# Patient Record
Sex: FEMALE | Race: WHITE | NOT HISPANIC OR LATINO | Employment: UNEMPLOYED | URBAN - METROPOLITAN AREA
[De-identification: names, ages, dates, MRNs, and addresses within clinical notes are randomized per-mention and may not be internally consistent; named-entity substitution may affect disease eponyms.]

---

## 2022-09-26 ENCOUNTER — TRANSCRIBE ORDERS (OUTPATIENT)
Dept: LAB | Facility: HOSPITAL | Age: 27
End: 2022-09-26

## 2022-09-26 ENCOUNTER — LAB (OUTPATIENT)
Dept: LAB | Facility: HOSPITAL | Age: 27
End: 2022-09-26

## 2022-09-26 DIAGNOSIS — Z11.4 SCREENING FOR HUMAN IMMUNODEFICIENCY VIRUS: ICD-10-CM

## 2022-09-26 DIAGNOSIS — Z11.3 SCREENING EXAMINATION FOR VENEREAL DISEASE: ICD-10-CM

## 2022-09-26 DIAGNOSIS — E28.2 POLYCYSTIC OVARIES: ICD-10-CM

## 2022-09-26 DIAGNOSIS — N92.1 METRORRHAGIA: ICD-10-CM

## 2022-09-26 DIAGNOSIS — Z11.3 SCREENING EXAMINATION FOR VENEREAL DISEASE: Primary | ICD-10-CM

## 2022-09-26 LAB
ABO GROUP BLD: NORMAL
BLD GP AB SCN SERPL QL: NEGATIVE
ESTRADIOL SERPL HS-MCNC: 125 PG/ML
PROLACTIN SERPL-MCNC: 14.1 NG/ML (ref 4.79–23.3)
RH BLD: POSITIVE
TSH SERPL DL<=0.05 MIU/L-ACNC: 2.34 UIU/ML (ref 0.27–4.2)

## 2022-09-26 PROCEDURE — 86644 CMV ANTIBODY: CPT

## 2022-09-26 PROCEDURE — 87340 HEPATITIS B SURFACE AG IA: CPT

## 2022-09-26 PROCEDURE — 84443 ASSAY THYROID STIM HORMONE: CPT | Performed by: SPECIALIST

## 2022-09-26 PROCEDURE — 86762 RUBELLA ANTIBODY: CPT

## 2022-09-26 PROCEDURE — 86787 VARICELLA-ZOSTER ANTIBODY: CPT

## 2022-09-26 PROCEDURE — 36415 COLL VENOUS BLD VENIPUNCTURE: CPT

## 2022-09-26 PROCEDURE — 82670 ASSAY OF TOTAL ESTRADIOL: CPT | Performed by: SPECIALIST

## 2022-09-26 PROCEDURE — 86704 HEP B CORE ANTIBODY TOTAL: CPT

## 2022-09-26 PROCEDURE — 87801 DETECT AGNT MULT DNA AMPLI: CPT

## 2022-09-26 PROCEDURE — 83498 ASY HYDROXYPROGESTERONE 17-D: CPT | Performed by: SPECIALIST

## 2022-09-26 PROCEDURE — 86703 HIV-1/HIV-2 1 RESULT ANTBDY: CPT

## 2022-09-26 PROCEDURE — 86901 BLOOD TYPING SEROLOGIC RH(D): CPT

## 2022-09-26 PROCEDURE — 84146 ASSAY OF PROLACTIN: CPT | Performed by: SPECIALIST

## 2022-09-26 PROCEDURE — 86850 RBC ANTIBODY SCREEN: CPT

## 2022-09-26 PROCEDURE — 86803 HEPATITIS C AB TEST: CPT

## 2022-09-26 PROCEDURE — 86645 CMV ANTIBODY IGM: CPT

## 2022-09-26 PROCEDURE — 82626 DEHYDROEPIANDROSTERONE: CPT | Performed by: SPECIALIST

## 2022-09-26 PROCEDURE — 86790 VIRUS ANTIBODY NOS: CPT

## 2022-09-26 PROCEDURE — 87591 N.GONORRHOEAE DNA AMP PROB: CPT

## 2022-09-26 PROCEDURE — 86780 TREPONEMA PALLIDUM: CPT

## 2022-09-26 PROCEDURE — 86900 BLOOD TYPING SEROLOGIC ABO: CPT

## 2022-09-26 PROCEDURE — 87491 CHLMYD TRACH DNA AMP PROBE: CPT

## 2022-09-27 LAB
CMV IGG SERPL IA-ACNC: >10 U/ML (ref 0–0.59)
CMV IGM SERPL IA-ACNC: 64.9 AU/ML (ref 0–29.9)
RUBV IGG SERPL IA-ACNC: 5.01 INDEX
VZV IGG SER IA-ACNC: 811 INDEX

## 2022-09-29 LAB — DHEA SERPL-MCNC: 168 NG/DL (ref 31–701)

## 2022-09-30 LAB
C TRACH RRNA SPEC DONR QL NAA+PROBE: NEGATIVE
HBV CORE AB SER DONR QL IA: NEGATIVE
HBV SURFACE AG SER DONR QL IA: NEGATIVE
HCV AB SER DONR QL IA: NEGATIVE
HIV 1+2 AB+HIV1 P24 AG SERPL QL IA: NEGATIVE
HIV1 RNA SERPL DONR QL PROBE AMP: NORMAL
HTLV I+II AB SER DONR QL: NEGATIVE
N GONORRHOEA RRNA SPEC DONR QL NAA+PROBE: NEGATIVE
T PALLIDUM IGG SER DONR QL: NON REACTIVE

## 2022-10-02 LAB — 17OHP SERPL-MCNC: 175 NG/DL

## 2022-10-27 ENCOUNTER — TRANSCRIBE ORDERS (OUTPATIENT)
Dept: ADMINISTRATIVE | Facility: HOSPITAL | Age: 27
End: 2022-10-27

## 2022-10-27 DIAGNOSIS — Z31.41 FERTILITY TESTING: Primary | ICD-10-CM

## 2022-11-03 ENCOUNTER — HOSPITAL ENCOUNTER (OUTPATIENT)
Dept: GENERAL RADIOLOGY | Facility: HOSPITAL | Age: 27
Discharge: HOME | End: 2022-11-03
Admitting: SPECIALIST

## 2022-11-03 ENCOUNTER — APPOINTMENT (OUTPATIENT)
Dept: GENERAL RADIOLOGY | Facility: HOSPITAL | Age: 27
End: 2022-11-03

## 2022-11-03 DIAGNOSIS — Z31.41 FERTILITY TESTING: ICD-10-CM

## 2022-11-03 PROCEDURE — 25010000002 IOPAMIDOL 61 % SOLUTION: Performed by: SPECIALIST

## 2022-11-03 PROCEDURE — 74740 X-RAY FEMALE GENITAL TRACT: CPT

## 2022-11-03 RX ADMIN — IOPAMIDOL 20 ML: 612 INJECTION, SOLUTION INTRAVENOUS at 08:51

## 2024-07-11 ENCOUNTER — OFFICE VISIT (OUTPATIENT)
Dept: CARDIOLOGY | Facility: CLINIC | Age: 29
End: 2024-07-11
Payer: COMMERCIAL

## 2024-07-11 VITALS
HEIGHT: 62 IN | HEART RATE: 109 BPM | BODY MASS INDEX: 39.2 KG/M2 | SYSTOLIC BLOOD PRESSURE: 106 MMHG | DIASTOLIC BLOOD PRESSURE: 66 MMHG | WEIGHT: 213 LBS

## 2024-07-11 DIAGNOSIS — R94.31 ABNORMAL EKG: Primary | ICD-10-CM

## 2024-07-11 DIAGNOSIS — R06.02 SHORTNESS OF BREATH: ICD-10-CM

## 2024-07-11 DIAGNOSIS — R00.0 TACHYCARDIA: ICD-10-CM

## 2024-07-11 DIAGNOSIS — D72.829 LEUKOCYTOSIS, UNSPECIFIED TYPE: ICD-10-CM

## 2024-07-11 DIAGNOSIS — R00.2 PALPITATIONS: ICD-10-CM

## 2024-07-11 DIAGNOSIS — E28.2 PCOS (POLYCYSTIC OVARIAN SYNDROME): ICD-10-CM

## 2024-07-11 DIAGNOSIS — E88.810 METABOLIC SYNDROME: ICD-10-CM

## 2024-07-11 RX ORDER — OMEPRAZOLE 10 MG/1
10 CAPSULE, DELAYED RELEASE ORAL DAILY
COMMUNITY

## 2024-07-11 RX ORDER — SEMAGLUTIDE 2.4 MG/.75ML
INJECTION, SOLUTION SUBCUTANEOUS
COMMUNITY

## 2024-07-11 RX ORDER — PROPRANOLOL HYDROCHLORIDE 160 MG/1
160 CAPSULE, EXTENDED RELEASE ORAL
COMMUNITY

## 2024-07-11 RX ORDER — CHLORAL HYDRATE 500 MG
1000 CAPSULE ORAL
COMMUNITY

## 2024-07-11 RX ORDER — LAMOTRIGINE 50 MG/1
50 TABLET, EXTENDED RELEASE ORAL DAILY
COMMUNITY

## 2024-07-11 RX ORDER — BUDESONIDE, GLYCOPYRROLATE, AND FORMOTEROL FUMARATE 160; 9; 4.8 UG/1; UG/1; UG/1
2 AEROSOL, METERED RESPIRATORY (INHALATION) 2 TIMES DAILY
COMMUNITY

## 2024-07-11 RX ORDER — HYDROXYZINE HYDROCHLORIDE 25 MG/1
25 TABLET, FILM COATED ORAL EVERY 8 HOURS PRN
COMMUNITY

## 2024-07-11 RX ORDER — BUSPIRONE HYDROCHLORIDE 30 MG/1
30 TABLET ORAL 2 TIMES DAILY
COMMUNITY

## 2024-07-11 RX ORDER — SERTRALINE HYDROCHLORIDE 150 MG/1
1 CAPSULE ORAL DAILY
COMMUNITY

## 2024-07-11 NOTE — LETTER
"July 11, 2024       No Recipients    Patient: Barbara Rajan   YOB: 1995   Date of Visit: 7/11/2024     Dear CHENCHO Bell:       Thank you for referring Barbara Rajan to me for evaluation. Below are the relevant portions of my assessment and plan of care.    If you have questions, please do not hesitate to call me. I look forward to following Barbara along with you.         Sincerely,        Chau Xiao MD        CC:   No Recipients    Chau Xiao MD  07/11/24 1443  Sign when Signing Visit  Chief Complaint   Patient presents with   • Establish Care     PCP referred, Cardiac clearance,weight loss surgery   • Cardiac history     None   • Abnormal ECG     Routine EKG completed at PCP's office for clearance for weight loss surgery. EKG was abnormal, referred her for cardiac clearance.    • Labs     Dr Parrish follows for history of WBC. She did labs today. PCP checked thyroid a few weeks ago, reports as normal. Will request results.    • Shortness of Breath     Some days worse than others. Admitted with pneumonia in 2021 after having Covid. Symptoms progressively gotten worse, \" some days worse than others\" Covid again the beginning of this year, pneumonia following         CARDIAC COMPLAINTS  dyspnea, fatigue, and abnormal EKG      Subjective  Barbara Rajan is a 28 y.o. female came in today for her initial cardiac evaluation.  She has history of PCOS who also has significant obesity.  She has been taking Wegovy and has lost about 30 pounds.  She is now considering gastric sleeve procedure.  She was seen at your office for pre op clearance.  Her EKG was abnormal.  She was referred to another cardiologist but apparently patient wanted to be seen here.    She denies having any chest pain.  She has been having shortness of breath for the last few years.  She got admitted with COVID in 2021 and she was in the hospital for few days.  Since then her " shortness of breath is progressively got worse.  She developed COVID again in the beginning of this year followed by pneumonia.  She also complain of having palpitation in the form of rapid heartbeat.  It has been going on since 2001 when she had her first episode of COVID.  She also has a chronic elevation of WBC.  She has been followed by the hematologist and the plan is to do a bone marrow if it persist.  She had other labs done at her office and was told that the thyroid function test and other tests were within normal limit.  She does have a history of smoking in the past but quit in 2018.  Father has heart disease and hypertension along with hypercholesterolemia    History reviewed. No pertinent surgical history.    Current Outpatient Medications   Medication Sig Dispense Refill   • Budeson-Glycopyrrol-Formoterol (Breztri Aerosphere) 160-9-4.8 MCG/ACT aerosol inhaler Inhale 2 puffs 2 (Two) Times a Day.     • busPIRone (BUSPAR) 30 MG tablet Take 1 tablet by mouth 2 (Two) Times a Day.     • cholecalciferol (VITAMIN D3) 1.25 MG (37818 UT) capsule Take 1 capsule by mouth Every 7 (Seven) Days.     • hydrOXYzine (ATARAX) 25 MG tablet Take 1 tablet by mouth Every 8 (Eight) Hours As Needed.     • lamoTRIgine ER 50 MG tablet sustained-release 24 hour Take 1 tablet by mouth Daily.     • metFORMIN (GLUCOPHAGE) 500 MG tablet Take 1 tablet by mouth 2 (Two) Times a Day With Meals.     • Omega-3 Fatty Acids (fish oil) 1000 MG capsule capsule Take 1 capsule by mouth Daily With Breakfast.     • omeprazole (prilOSEC) 10 MG capsule Take 1 capsule by mouth Daily.     • propranolol LA (INDERAL LA) 160 MG 24 hr capsule Take 1 capsule by mouth Daily.     • Sertraline HCl 150 MG capsule Take 1 capsule by mouth Daily.     • Wegovy 2.4 MG/0.75ML solution auto-injector weekly       No current facility-administered medications for this visit.           ALLERGIES:  Patient has no known allergies.    Past Medical History:   Diagnosis Date    • Abnormal ECG 2024   • Asthma     Had Covid pneumonia twice and have trouble breathing at times   • Deep vein thrombosis     After child birth   • Hyperlipidemia     Was taking statin and stopped due to trying to get pregnant   • Hypertension    • Sleep apnea        Social History     Tobacco Use   Smoking Status Former   • Current packs/day: 0.00   • Average packs/day: 1 pack/day for 6.3 years (6.3 ttl pk-yrs)   • Types: Cigarettes   • Start date: 2012   • Quit date: 2018   • Years since quittin.2   Smokeless Tobacco Never          Family History   Problem Relation Age of Onset   • Anemia Mother    • Asthma Mother    • Fainting Mother    • Heart disease Father    • Hyperlipidemia Father    • Hypertension Father    • Hypertension Brother    • Heart disease Maternal Grandmother    • Heart failure Maternal Grandmother    • Hyperlipidemia Maternal Grandmother    • Hypertension Maternal Grandmother    • No Known Problems Daughter        Review of Systems   Constitutional: Negative for decreased appetite and malaise/fatigue.   HENT:  Negative for congestion and sore throat.    Eyes:  Negative for blurred vision, double vision and visual disturbance.   Cardiovascular:  Positive for dyspnea on exertion and palpitations. Negative for chest pain.   Respiratory:  Positive for shortness of breath. Negative for snoring.    Endocrine: Negative for cold intolerance and heat intolerance.   Hematologic/Lymphatic: Negative for adenopathy. Does not bruise/bleed easily.   Skin:  Negative for itching, nail changes and skin cancer.   Musculoskeletal:  Negative for arthritis and myalgias.   Gastrointestinal:  Negative for abdominal pain, dysphagia and heartburn.   Genitourinary:  Negative for bladder incontinence and frequency.   Neurological:  Negative for dizziness, seizures and vertigo.   Psychiatric/Behavioral:  Negative for altered mental status.    Allergic/Immunologic: Negative for  "environmental allergies and hives.     Diabetes- No  Thyroid- normal    Objective    /66 (BP Location: Right arm)   Pulse 109   Ht 157.5 cm (62\")   Wt 96.6 kg (213 lb)   BMI 38.96 kg/m²     Vitals and nursing note reviewed.   Constitutional:       Appearance: Healthy appearance. Not in distress.   Eyes:      Conjunctiva/sclera: Conjunctivae normal.      Pupils: Pupils are equal, round, and reactive to light.   HENT:      Head: Normocephalic.   Pulmonary:      Effort: Pulmonary effort is normal.      Breath sounds: Normal breath sounds.   Cardiovascular:      PMI at left midclavicular line. Tachycardia present. Regular rhythm.      Murmurs: There is a grade 3/6 high frequency blowing holosystolic murmur at the apex.   Abdominal:      General: Bowel sounds are normal.      Palpations: Abdomen is soft.   Musculoskeletal: Normal range of motion.      Cervical back: Normal range of motion and neck supple. Skin:     General: Skin is warm and dry.   Neurological:      Mental Status: Alert, oriented to person, place, and time and oriented to person, place and time.       ECG 12 Lead    Date/Time: 7/11/2024 2:43 PM  Performed by: Chau Xiao MD    Authorized by: Chau Xiao MD  Comparison: compared with previous ECG from 6/27/2024  Similar to previous ECG  Rhythm: sinus tachycardia  Rate: tachycardic  QRS axis: normal  Other findings: non-specific ST-T wave changes    Clinical impression: abnormal EKG        @ASSESSMENT/PLAN@        Diagnoses and all orders for this visit:    1. Abnormal EKG (Primary)  -     Adult Transthoracic Echo Complete W/ Cont if Necessary Per Protocol; Future  -     Holter Monitor - 72 Hour Up To 15 Days; Future  -     CBC & Differential; Future  -     Lipid Panel; Future    2. Shortness of breath  -     Adult Transthoracic Echo Complete W/ Cont if Necessary Per Protocol; Future  -     Comprehensive Metabolic Panel; Future  -     BNP; Future    3. Metabolic syndrome  -     " Lipid Panel; Future    4. PCOS (polycystic ovarian syndrome)    5. Tachycardia  -     Holter Monitor - 72 Hour Up To 15 Days; Future    6. Leukocytosis, unspecified type    7. Palpitations  -     Holter Monitor - 72 Hour Up To 15 Days; Future  -     TSH; Future  -     T4, Free; Future       At baseline, she is tachycardic.  Her blood pressure is lower limit of normal.  Her EKG shows sinus tachycardia with nonspecific ST-T changes.  Her clinical examination reveals a BMI of 39.  Her cardiovascular examination reveals soft heart sounds with a short systolic murmur the mitral area.    Regarding the abnormal EKG, she does have a sinus tachycardia and the T wave changes could be related to the heart rate itself.  I do like to get an echocardiogram to evaluate the LV function, LVH and valvular structures.  If the left ventricular function is abnormal or if there is any wall motion abnormality then she may need to undergo a stress test.    Regarding shortness of breath, it could be secondary to her obesity and the pneumonia she had in the past.  I do like to rule out cardiac.  I like to get a BNP level checked along with electrolytes.  I also scheduled her to undergo an echocardiogram    Regarding metabolic syndrome, she is already on Wegovy.  I gave her papers on DASH diet.  If the LV function is normal then cardiac clearance will be given    Regarding her PCOS, she is on metformin.  Will continue the same    Regarding the tachycardia, will place a Holter monitor.  If she has multiple short runs of SVT or PAF then may need to consider class Ic antiarrhythmic medication    Regarding the leukocytosis, she has been followed by the hematologist    Based on the results, further recommendations will be made             Electronically signed by Chau Xiao MD July 11, 2024 14:43 EDT

## 2024-07-11 NOTE — PROGRESS NOTES
"Chief Complaint   Patient presents with   • Establish Care     PCP referred, Cardiac clearance,weight loss surgery   • Cardiac history     None   • Abnormal ECG     Routine EKG completed at PCP's office for clearance for weight loss surgery. EKG was abnormal, referred her for cardiac clearance.    • Labs     Dr Parrish follows for history of WBC. She did labs today. PCP checked thyroid a few weeks ago, reports as normal. Will request results.    • Shortness of Breath     Some days worse than others. Admitted with pneumonia in 2021 after having Covid. Symptoms progressively gotten worse, \" some days worse than others\" Covid again the beginning of this year, pneumonia following         CARDIAC COMPLAINTS  dyspnea, fatigue, and abnormal EKG      Subjective   Barbara Rajan is a 28 y.o. female came in today for her initial cardiac evaluation.  She has history of PCOS who also has significant obesity.  She has been taking Wegovy and has lost about 30 pounds.  She is now considering gastric sleeve procedure.  She was seen at your office for pre op clearance.  Her EKG was abnormal.  She was referred to another cardiologist but apparently patient wanted to be seen here.    She denies having any chest pain.  She has been having shortness of breath for the last few years.  She got admitted with COVID in 2021 and she was in the hospital for few days.  Since then her shortness of breath is progressively got worse.  She developed COVID again in the beginning of this year followed by pneumonia.  She also complain of having palpitation in the form of rapid heartbeat.  It has been going on since 2001 when she had her first episode of COVID.  She also has a chronic elevation of WBC.  She has been followed by the hematologist and the plan is to do a bone marrow if it persist.  She had other labs done at her office and was told that the thyroid function test and other tests were within normal limit.  She does have a " history of smoking in the past but quit in 2018.  Father has heart disease and hypertension along with hypercholesterolemia    History reviewed. No pertinent surgical history.    Current Outpatient Medications   Medication Sig Dispense Refill   • Budeson-Glycopyrrol-Formoterol (Breztri Aerosphere) 160-9-4.8 MCG/ACT aerosol inhaler Inhale 2 puffs 2 (Two) Times a Day.     • busPIRone (BUSPAR) 30 MG tablet Take 1 tablet by mouth 2 (Two) Times a Day.     • cholecalciferol (VITAMIN D3) 1.25 MG (63651 UT) capsule Take 1 capsule by mouth Every 7 (Seven) Days.     • hydrOXYzine (ATARAX) 25 MG tablet Take 1 tablet by mouth Every 8 (Eight) Hours As Needed.     • lamoTRIgine ER 50 MG tablet sustained-release 24 hour Take 1 tablet by mouth Daily.     • metFORMIN (GLUCOPHAGE) 500 MG tablet Take 1 tablet by mouth 2 (Two) Times a Day With Meals.     • Omega-3 Fatty Acids (fish oil) 1000 MG capsule capsule Take 1 capsule by mouth Daily With Breakfast.     • omeprazole (prilOSEC) 10 MG capsule Take 1 capsule by mouth Daily.     • propranolol LA (INDERAL LA) 160 MG 24 hr capsule Take 1 capsule by mouth Daily.     • Sertraline HCl 150 MG capsule Take 1 capsule by mouth Daily.     • Wegovy 2.4 MG/0.75ML solution auto-injector weekly       No current facility-administered medications for this visit.           ALLERGIES:  Patient has no known allergies.    Past Medical History:   Diagnosis Date   • Abnormal ECG 2024   • Asthma     Had Covid pneumonia twice and have trouble breathing at times   • Deep vein thrombosis     After child birth   • Hyperlipidemia     Was taking statin and stopped due to trying to get pregnant   • Hypertension 2018   • Sleep apnea 2018       Social History     Tobacco Use   Smoking Status Former   • Current packs/day: 0.00   • Average packs/day: 1 pack/day for 6.3 years (6.3 ttl pk-yrs)   • Types: Cigarettes   • Start date: 2012   • Quit date: 2018   • Years since quittin.2   Smokeless  "Tobacco Never          Family History   Problem Relation Age of Onset   • Anemia Mother    • Asthma Mother    • Fainting Mother    • Heart disease Father    • Hyperlipidemia Father    • Hypertension Father    • Hypertension Brother    • Heart disease Maternal Grandmother    • Heart failure Maternal Grandmother    • Hyperlipidemia Maternal Grandmother    • Hypertension Maternal Grandmother    • No Known Problems Daughter        Review of Systems   Constitutional: Negative for decreased appetite and malaise/fatigue.   HENT:  Negative for congestion and sore throat.    Eyes:  Negative for blurred vision, double vision and visual disturbance.   Cardiovascular:  Positive for dyspnea on exertion and palpitations. Negative for chest pain.   Respiratory:  Positive for shortness of breath. Negative for snoring.    Endocrine: Negative for cold intolerance and heat intolerance.   Hematologic/Lymphatic: Negative for adenopathy. Does not bruise/bleed easily.   Skin:  Negative for itching, nail changes and skin cancer.   Musculoskeletal:  Negative for arthritis and myalgias.   Gastrointestinal:  Negative for abdominal pain, dysphagia and heartburn.   Genitourinary:  Negative for bladder incontinence and frequency.   Neurological:  Negative for dizziness, seizures and vertigo.   Psychiatric/Behavioral:  Negative for altered mental status.    Allergic/Immunologic: Negative for environmental allergies and hives.     Diabetes- No  Thyroid- normal    Objective     /66 (BP Location: Right arm)   Pulse 109   Ht 157.5 cm (62\")   Wt 96.6 kg (213 lb)   BMI 38.96 kg/m²     Vitals and nursing note reviewed.   Constitutional:       Appearance: Healthy appearance. Not in distress.   Eyes:      Conjunctiva/sclera: Conjunctivae normal.      Pupils: Pupils are equal, round, and reactive to light.   HENT:      Head: Normocephalic.   Pulmonary:      Effort: Pulmonary effort is normal.      Breath sounds: Normal breath sounds. "   Cardiovascular:      PMI at left midclavicular line. Tachycardia present. Regular rhythm.      Murmurs: There is a grade 3/6 high frequency blowing holosystolic murmur at the apex.   Abdominal:      General: Bowel sounds are normal.      Palpations: Abdomen is soft.   Musculoskeletal: Normal range of motion.      Cervical back: Normal range of motion and neck supple. Skin:     General: Skin is warm and dry.   Neurological:      Mental Status: Alert, oriented to person, place, and time and oriented to person, place and time.       ECG 12 Lead    Date/Time: 7/11/2024 2:43 PM  Performed by: Chau Xiao MD    Authorized by: Chau Xiao MD  Comparison: compared with previous ECG from 6/27/2024  Similar to previous ECG  Rhythm: sinus tachycardia  Rate: tachycardic  QRS axis: normal  Other findings: non-specific ST-T wave changes    Clinical impression: abnormal EKG        @ASSESSMENT/PLAN@        Diagnoses and all orders for this visit:    1. Abnormal EKG (Primary)  -     Adult Transthoracic Echo Complete W/ Cont if Necessary Per Protocol; Future  -     Holter Monitor - 72 Hour Up To 15 Days; Future  -     CBC & Differential; Future  -     Lipid Panel; Future    2. Shortness of breath  -     Adult Transthoracic Echo Complete W/ Cont if Necessary Per Protocol; Future  -     Comprehensive Metabolic Panel; Future  -     BNP; Future    3. Metabolic syndrome  -     Lipid Panel; Future    4. PCOS (polycystic ovarian syndrome)    5. Tachycardia  -     Holter Monitor - 72 Hour Up To 15 Days; Future    6. Leukocytosis, unspecified type    7. Palpitations  -     Holter Monitor - 72 Hour Up To 15 Days; Future  -     TSH; Future  -     T4, Free; Future       At baseline, she is tachycardic.  Her blood pressure is lower limit of normal.  Her EKG shows sinus tachycardia with nonspecific ST-T changes.  Her clinical examination reveals a BMI of 39.  Her cardiovascular examination reveals soft heart sounds with a short  systolic murmur the mitral area.    Regarding the abnormal EKG, she does have a sinus tachycardia and the T wave changes could be related to the heart rate itself.  I do like to get an echocardiogram to evaluate the LV function, LVH and valvular structures.  If the left ventricular function is abnormal or if there is any wall motion abnormality then she may need to undergo a stress test.    Regarding shortness of breath, it could be secondary to her obesity and the pneumonia she had in the past.  I do like to rule out cardiac.  I like to get a BNP level checked along with electrolytes.  I also scheduled her to undergo an echocardiogram    Regarding metabolic syndrome, she is already on Wegovy.  I gave her papers on DASH diet.  If the LV function is normal then cardiac clearance will be given    Regarding her PCOS, she is on metformin.  Will continue the same    Regarding the tachycardia, will place a Holter monitor.  If she has multiple short runs of SVT or PAF then may need to consider class Ic antiarrhythmic medication    Regarding the leukocytosis, she has been followed by the hematologist    Based on the results, further recommendations will be made             Electronically signed by Chau Xiao MD July 11, 2024 14:43 EDT

## 2024-07-18 ENCOUNTER — HOSPITAL ENCOUNTER (OUTPATIENT)
Dept: CARDIOLOGY | Facility: HOSPITAL | Age: 29
Discharge: HOME | End: 2024-07-18
Payer: COMMERCIAL

## 2024-07-18 ENCOUNTER — LAB (OUTPATIENT)
Dept: LAB | Facility: HOSPITAL | Age: 29
End: 2024-07-18
Payer: COMMERCIAL

## 2024-07-18 VITALS — WEIGHT: 212.96 LBS | BODY MASS INDEX: 39.19 KG/M2 | HEIGHT: 62 IN

## 2024-07-18 DIAGNOSIS — E88.810 METABOLIC SYNDROME: ICD-10-CM

## 2024-07-18 DIAGNOSIS — R06.02 SHORTNESS OF BREATH: ICD-10-CM

## 2024-07-18 DIAGNOSIS — R94.31 ABNORMAL EKG: ICD-10-CM

## 2024-07-18 DIAGNOSIS — R00.2 PALPITATIONS: ICD-10-CM

## 2024-07-18 LAB
ALBUMIN SERPL-MCNC: 4.3 G/DL (ref 3.5–5.2)
ALBUMIN/GLOB SERPL: 1.3 G/DL
ALP SERPL-CCNC: 123 U/L (ref 39–117)
ALT SERPL W P-5'-P-CCNC: 18 U/L (ref 1–33)
ANION GAP SERPL CALCULATED.3IONS-SCNC: 11.9 MMOL/L (ref 5–15)
AORTIC DIMENSIONLESS INDEX: 0.97 (DI)
AST SERPL-CCNC: 16 U/L (ref 1–32)
BASOPHILS # BLD AUTO: 0.09 10*3/MM3 (ref 0–0.2)
BASOPHILS NFR BLD AUTO: 0.6 % (ref 0–1.5)
BH CV ECHO MEAS - AO MAX PG: 5.6 MMHG
BH CV ECHO MEAS - AO MEAN PG: 3.1 MMHG
BH CV ECHO MEAS - AO ROOT DIAM: 2.7 CM
BH CV ECHO MEAS - AO V2 MAX: 118.8 CM/SEC
BH CV ECHO MEAS - AO V2 VTI: 23.6 CM
BH CV ECHO MEAS - EDV(CUBED): 75.8 ML
BH CV ECHO MEAS - EF_3D-VOL: 51 %
BH CV ECHO MEAS - ESV(CUBED): 21.7 ML
BH CV ECHO MEAS - FS: 34.1 %
BH CV ECHO MEAS - IVS/LVPW: 0.92 CM
BH CV ECHO MEAS - IVSD: 0.93 CM
BH CV ECHO MEAS - LA DIMENSION: 3.4 CM
BH CV ECHO MEAS - LAT PEAK E' VEL: 12.5 CM/SEC
BH CV ECHO MEAS - LV MASS(C)D: 132.5 GRAMS
BH CV ECHO MEAS - LV MAX PG: 4.5 MMHG
BH CV ECHO MEAS - LV MEAN PG: 2.16 MMHG
BH CV ECHO MEAS - LV V1 MAX: 105.8 CM/SEC
BH CV ECHO MEAS - LV V1 VTI: 22.9 CM
BH CV ECHO MEAS - LVIDD: 4.2 CM
BH CV ECHO MEAS - LVIDS: 2.8 CM
BH CV ECHO MEAS - LVPWD: 1.01 CM
BH CV ECHO MEAS - MED PEAK E' VEL: 9.7 CM/SEC
BH CV ECHO MEAS - MV A MAX VEL: 56.1 CM/SEC
BH CV ECHO MEAS - MV DEC SLOPE: 560.2 CM/SEC2
BH CV ECHO MEAS - MV DEC TIME: 0.2 SEC
BH CV ECHO MEAS - MV E MAX VEL: 95.1 CM/SEC
BH CV ECHO MEAS - MV E/A: 1.69
BH CV ECHO MEAS - MV MAX PG: 6.5 MMHG
BH CV ECHO MEAS - MV MEAN PG: 1.85 MMHG
BH CV ECHO MEAS - MV P1/2T: 65.2 MSEC
BH CV ECHO MEAS - MV V2 VTI: 32.1 CM
BH CV ECHO MEAS - MVA(P1/2T): 3.4 CM2
BH CV ECHO MEAS - PA V2 MAX: 109.2 CM/SEC
BH CV ECHO MEAS - RAP SYSTOLE: 3 MMHG
BH CV ECHO MEAS - RV MAX PG: 2.14 MMHG
BH CV ECHO MEAS - RV V1 MAX: 73.2 CM/SEC
BH CV ECHO MEAS - RV V1 VTI: 17.2 CM
BH CV ECHO MEAS - RVDD: 3.3 CM
BH CV ECHO MEAS - RVSP: 8 MMHG
BH CV ECHO MEAS - TAPSE (>1.6): 2.9 CM
BH CV ECHO MEAS - TR MAX PG: 5 MMHG
BH CV ECHO MEAS - TR MAX VEL: 112.1 CM/SEC
BH CV ECHO MEASUREMENTS AVERAGE E/E' RATIO: 8.57
BH CV XLRA - TDI S': 13.2 CM/SEC
BILIRUB SERPL-MCNC: 0.3 MG/DL (ref 0–1.2)
BUN SERPL-MCNC: 10 MG/DL (ref 6–20)
BUN/CREAT SERPL: 11.2 (ref 7–25)
CALCIUM SPEC-SCNC: 9.3 MG/DL (ref 8.6–10.5)
CHLORIDE SERPL-SCNC: 103 MMOL/L (ref 98–107)
CHOLEST SERPL-MCNC: 222 MG/DL (ref 0–200)
CO2 SERPL-SCNC: 23.1 MMOL/L (ref 22–29)
CREAT SERPL-MCNC: 0.89 MG/DL (ref 0.57–1)
DEPRECATED RDW RBC AUTO: 48.2 FL (ref 37–54)
EGFRCR SERPLBLD CKD-EPI 2021: 89.6 ML/MIN/1.73
EOSINOPHIL # BLD AUTO: 0.34 10*3/MM3 (ref 0–0.4)
EOSINOPHIL NFR BLD AUTO: 2.2 % (ref 0.3–6.2)
ERYTHROCYTE [DISTWIDTH] IN BLOOD BY AUTOMATED COUNT: 16.4 % (ref 12.3–15.4)
GLOBULIN UR ELPH-MCNC: 3.4 GM/DL
GLUCOSE SERPL-MCNC: 99 MG/DL (ref 65–99)
HCT VFR BLD AUTO: 41.4 % (ref 34–46.6)
HDLC SERPL-MCNC: 32 MG/DL (ref 40–60)
HGB BLD-MCNC: 12.9 G/DL (ref 12–15.9)
IMM GRANULOCYTES # BLD AUTO: 0.1 10*3/MM3 (ref 0–0.05)
IMM GRANULOCYTES NFR BLD AUTO: 0.6 % (ref 0–0.5)
LDLC SERPL CALC-MCNC: 166 MG/DL (ref 0–100)
LDLC/HDLC SERPL: 5.14 {RATIO}
LYMPHOCYTES # BLD AUTO: 4.98 10*3/MM3 (ref 0.7–3.1)
LYMPHOCYTES NFR BLD AUTO: 31.8 % (ref 19.6–45.3)
MCH RBC QN AUTO: 25.3 PG (ref 26.6–33)
MCHC RBC AUTO-ENTMCNC: 31.2 G/DL (ref 31.5–35.7)
MCV RBC AUTO: 81.3 FL (ref 79–97)
MONOCYTES # BLD AUTO: 0.99 10*3/MM3 (ref 0.1–0.9)
MONOCYTES NFR BLD AUTO: 6.3 % (ref 5–12)
NEUTROPHILS NFR BLD AUTO: 58.5 % (ref 42.7–76)
NEUTROPHILS NFR BLD AUTO: 9.17 10*3/MM3 (ref 1.7–7)
NRBC BLD AUTO-RTO: 0 /100 WBC (ref 0–0.2)
NT-PROBNP SERPL-MCNC: 36.1 PG/ML (ref 0–450)
PLATELET # BLD AUTO: 370 10*3/MM3 (ref 140–450)
PMV BLD AUTO: 10.5 FL (ref 6–12)
POTASSIUM SERPL-SCNC: 4.2 MMOL/L (ref 3.5–5.2)
PROT SERPL-MCNC: 7.7 G/DL (ref 6–8.5)
RBC # BLD AUTO: 5.09 10*6/MM3 (ref 3.77–5.28)
SINUS: 2.6 CM
SODIUM SERPL-SCNC: 138 MMOL/L (ref 136–145)
T4 FREE SERPL-MCNC: 1.13 NG/DL (ref 0.93–1.7)
TRIGL SERPL-MCNC: 128 MG/DL (ref 0–150)
TSH SERPL DL<=0.05 MIU/L-ACNC: 1.68 UIU/ML (ref 0.27–4.2)
VLDLC SERPL-MCNC: 24 MG/DL (ref 5–40)
WBC NRBC COR # BLD AUTO: 15.67 10*3/MM3 (ref 3.4–10.8)

## 2024-07-18 PROCEDURE — 93306 TTE W/DOPPLER COMPLETE: CPT

## 2024-07-18 PROCEDURE — 80050 GENERAL HEALTH PANEL: CPT

## 2024-07-18 PROCEDURE — 83880 ASSAY OF NATRIURETIC PEPTIDE: CPT

## 2024-07-18 PROCEDURE — 36415 COLL VENOUS BLD VENIPUNCTURE: CPT

## 2024-07-18 PROCEDURE — 80061 LIPID PANEL: CPT

## 2024-07-18 PROCEDURE — 84439 ASSAY OF FREE THYROXINE: CPT

## 2024-07-22 ENCOUNTER — TELEPHONE (OUTPATIENT)
Dept: CARDIOLOGY | Facility: CLINIC | Age: 29
End: 2024-07-22
Payer: COMMERCIAL

## 2024-07-22 RX ORDER — ROSUVASTATIN CALCIUM 10 MG/1
10 TABLET, COATED ORAL DAILY
Qty: 90 TABLET | Refills: 3 | Status: SHIPPED | OUTPATIENT
Start: 2024-07-22

## 2024-07-22 NOTE — TELEPHONE ENCOUNTER
Patient was made aware of results of lab work. Patient was made aware that script for crestor 10 mg daily would be sent to [facility]. 90 tablets and 11 refills.

## 2024-11-14 RX ORDER — ROSUVASTATIN CALCIUM 10 MG/1
10 TABLET, COATED ORAL DAILY
Qty: 30 TABLET | Refills: 5 | Status: SHIPPED | OUTPATIENT
Start: 2024-11-14

## 2024-11-14 NOTE — TELEPHONE ENCOUNTER
Rx Refill Note  Requested Prescriptions     Pending Prescriptions Disp Refills    rosuvastatin (CRESTOR) 10 MG tablet [Pharmacy Med Name: ROSUVASTATIN CALCIUM 10 MG TAB] 30 tablet 0     Sig: TAKE ONE TABLET BY MOUTH EVERY DAY      Last office visit with prescribing clinician: 7/11/2024   Last telemedicine visit with prescribing clinician: Visit date not found   Next office visit with prescribing clinician: Visit date not found                         Would you like a call back once the refill request has been completed: [] Yes [] No    If the office needs to give you a call back, can they leave a voicemail: [] Yes [] No    Saniya Downs CMA  11/14/24, 12:05 EST